# Patient Record
Sex: FEMALE | Race: OTHER | HISPANIC OR LATINO
[De-identification: names, ages, dates, MRNs, and addresses within clinical notes are randomized per-mention and may not be internally consistent; named-entity substitution may affect disease eponyms.]

---

## 2021-01-06 PROBLEM — Z00.00 ENCOUNTER FOR PREVENTIVE HEALTH EXAMINATION: Status: ACTIVE | Noted: 2021-01-06

## 2021-01-08 ENCOUNTER — APPOINTMENT (OUTPATIENT)
Dept: VASCULAR SURGERY | Facility: CLINIC | Age: 71
End: 2021-01-08
Payer: MEDICARE

## 2021-01-08 PROCEDURE — 99203 OFFICE O/P NEW LOW 30 MIN: CPT

## 2021-01-08 PROCEDURE — 99072 ADDL SUPL MATRL&STAF TM PHE: CPT

## 2021-01-08 NOTE — PHYSICAL EXAM
[Respiratory Effort] : normal respiratory effort [Normal Rate and Rhythm] : normal rate and rhythm [2+] : left 2+ [Alert] : alert [Oriented to Person] : oriented to person [Oriented to Place] : oriented to place [Oriented to Time] : oriented to time [Calm] : calm [JVD] : no jugular venous distention  [Ankle Swelling (On Exam)] : not present [Varicose Veins Of Lower Extremities] : not present [] : not present [Abdomen Tenderness] : ~T ~M No abdominal tenderness [de-identified] : Well appearing  [de-identified] : NC/AT  [de-identified] : FROM 5/5 x 4.  [de-identified] : Diffuse spider veins over the b/l thigh and veins.

## 2021-01-08 NOTE — ASSESSMENT
[FreeTextEntry1] : 69 y/o F with diffuse spider veins. Discussed sclerotherapy, pt with spider veins over the BLEs. I have discussed with pt, this is a cosmetic procedure, therefore insurance does not cover them and must be paid of out pocket, each session costs ~400-500 USD. Given they are not symptomatic, patient would like to continue with a conservative management. She will continue to f/u with us here PRN. \par \par

## 2021-01-08 NOTE — ADDENDUM
[FreeTextEntry1] : This note was written by Brooklynn Melendrez on 01/08/2021 acting as scribe for Glen Carrizales M.D.\par \par I, Glen Mata have read and attest that all the information, medical decision making and discharge instructions within are true and accurate.

## 2021-01-08 NOTE — HISTORY OF PRESENT ILLNESS
[FreeTextEntry1] : 71 y/o F presents for initial evaluation of spider veins.  long standing history of varicosities but over the years her varicose veins became large, more pronounced and tender to touch. She reports they do not interfere with her daily activities. She denies any fmhx of varicosities, DVT/SVT. Denies any skin breakdown, skin discoloration,  fever or chills.